# Patient Record
Sex: FEMALE | Race: WHITE | NOT HISPANIC OR LATINO | Employment: OTHER | URBAN - METROPOLITAN AREA
[De-identification: names, ages, dates, MRNs, and addresses within clinical notes are randomized per-mention and may not be internally consistent; named-entity substitution may affect disease eponyms.]

---

## 2020-02-05 ENCOUNTER — TRANSFERRED RECORDS (OUTPATIENT)
Dept: HEALTH INFORMATION MANAGEMENT | Facility: CLINIC | Age: 70
End: 2020-02-05

## 2020-02-27 ENCOUNTER — TRANSFERRED RECORDS (OUTPATIENT)
Dept: HEALTH INFORMATION MANAGEMENT | Facility: CLINIC | Age: 70
End: 2020-02-27

## 2020-03-17 ENCOUNTER — TRANSFERRED RECORDS (OUTPATIENT)
Dept: HEALTH INFORMATION MANAGEMENT | Facility: CLINIC | Age: 70
End: 2020-03-17

## 2020-07-14 ENCOUNTER — TRANSFERRED RECORDS (OUTPATIENT)
Dept: HEALTH INFORMATION MANAGEMENT | Facility: CLINIC | Age: 70
End: 2020-07-14

## 2020-07-29 ENCOUNTER — MEDICAL CORRESPONDENCE (OUTPATIENT)
Dept: HEALTH INFORMATION MANAGEMENT | Facility: CLINIC | Age: 70
End: 2020-07-29

## 2020-07-30 ENCOUNTER — MEDICAL CORRESPONDENCE (OUTPATIENT)
Dept: HEALTH INFORMATION MANAGEMENT | Facility: CLINIC | Age: 70
End: 2020-07-30

## 2020-08-04 ENCOUNTER — TELEPHONE (OUTPATIENT)
Dept: TRANSPLANT | Facility: CLINIC | Age: 70
End: 2020-08-04

## 2020-08-04 NOTE — TELEPHONE ENCOUNTER
Patient Call: Voicemail  Date/Time: 8/4/20 / 2:25 pm  Reason for call: Patient received a call from rey Jeronimo about a referral. Patient could not make out the return call phone number she left. What she could make out was [phone]. Please call back with clarification of phone number if able.

## 2020-08-04 NOTE — TELEPHONE ENCOUNTER
Patient Call: Voicemail  Date/Time: 8/4/20 358pm  Reason for call: Patient left message returning Kandace's call

## 2020-08-12 ENCOUNTER — TRANSFERRED RECORDS (OUTPATIENT)
Dept: HEALTH INFORMATION MANAGEMENT | Facility: CLINIC | Age: 70
End: 2020-08-12

## 2020-08-12 LAB
GLUCOSE SERPL-MCNC: 154 MG/DL (ref 60–99)
INR PPP: 1 (ref 0.9–1.2)

## 2020-08-13 ENCOUNTER — DOCUMENTATION ONLY (OUTPATIENT)
Dept: TRANSPLANT | Facility: CLINIC | Age: 70
End: 2020-08-13

## 2020-08-17 ENCOUNTER — TELEPHONE (OUTPATIENT)
Dept: TRANSPLANT | Facility: CLINIC | Age: 70
End: 2020-08-17

## 2020-08-17 NOTE — TELEPHONE ENCOUNTER
Patient Call: Voicemail  Date/Time: 8/17/20 / 9:49 am  Reason for call: Patient received some forms via email and has a question about one of the forms.     Return call requested.

## 2020-08-18 ENCOUNTER — TELEPHONE (OUTPATIENT)
Dept: TRANSPLANT | Facility: CLINIC | Age: 70
End: 2020-08-18

## 2020-08-18 NOTE — TELEPHONE ENCOUNTER
Called and spoke with referring provider for path results. Will push slides and path report. Well diff adenocarcinoma. Will review at [facility] and this RN will get back to provider to finalize plans. Already seen by local onc who ordered the biopsy and should be contacted patient with results.

## 2020-08-18 NOTE — TELEPHONE ENCOUNTER
Spoke with Alexandra who has GIULIANA for [facility]. This rN advised to cross that off and change to [facility].

## 2020-08-21 ENCOUNTER — DOCUMENTATION ONLY (OUTPATIENT)
Dept: TRANSPLANT | Facility: CLINIC | Age: 70
End: 2020-08-21

## 2020-08-21 DIAGNOSIS — C80.1 ADENOCARCINOID TUMOR (H): Primary | ICD-10-CM

## 2020-08-21 DIAGNOSIS — K74.60 CIRRHOSIS OF LIVER (H): ICD-10-CM

## 2020-08-21 NOTE — PROGRESS NOTES
20 OSH imagin.) seg 8 lesion - 2.5 cm  2.) bx proven mod diff adeno    Recs:  - Hepatobili sx consult for ablation   - not a resection candidate

## 2020-08-24 ENCOUNTER — PATIENT OUTREACH (OUTPATIENT)
Dept: SURGERY | Facility: CLINIC | Age: 70
End: 2020-08-24

## 2020-08-24 NOTE — TELEPHONE ENCOUNTER
New Patient Oncology Nurse Navigator Note     Referring provider: Dr. Mccarthy     Referring Clinic/Organization: [facility]      Referred to: Surgical Oncology -  Hepatobiliary / GI Cancers    Requested provider (if applicable): First available provider    Referral Received: 08/24/20       Evaluation for : Liver mass, cholangiocarcinoma       Records Location: [facility] Everywhere   Faxed - Media tab/Scanned       Referral updates and Plan:       Consult with Surgical Oncology      08/24/2020 2:34 PM - called and left a message for patient regarding referral and that we are working on scheduling her for a consult. Per notes from Transplant team patient is being set up locally for colonoscopy and PET scan. Will need those records prior to consult preferably. Left my direct number for patient to call back and discuss.     08/25/2020 10:34 AM - Received note from transplant coordinator regarding patients referral and that patient has decided to do treatment locally.     Referral closed.       Joanna Bridges, RN, BSN   Surgical Oncology New Patient Nurse Navigator  [facility]  [phone]

## 2020-08-26 ENCOUNTER — DOCUMENTATION ONLY (OUTPATIENT)
Dept: ONCOLOGY | Facility: CLINIC | Age: 70
End: 2020-08-26

## 2020-08-26 NOTE — PROGRESS NOTES
Action BALDEMAR   Action Taken 8/26/20: Slides from [facility] - taken to 5th floor lab @ [facility]  1:53 PM

## 2020-08-27 PROCEDURE — 00000346 ZZHCL STATISTIC REVIEW OUTSIDE SLIDES TC 88321: Performed by: INTERNAL MEDICINE

## 2020-08-28 LAB — COPATH REPORT: NORMAL
